# Patient Record
Sex: MALE | Race: BLACK OR AFRICAN AMERICAN | URBAN - METROPOLITAN AREA
[De-identification: names, ages, dates, MRNs, and addresses within clinical notes are randomized per-mention and may not be internally consistent; named-entity substitution may affect disease eponyms.]

---

## 2021-04-05 ENCOUNTER — OFFICE VISIT (OUTPATIENT)
Dept: URBAN - METROPOLITAN AREA CLINIC 17 | Facility: CLINIC | Age: 86
End: 2021-04-05
Payer: COMMERCIAL

## 2021-04-05 PROCEDURE — 92020 GONIOSCOPY: CPT | Performed by: OPHTHALMOLOGY

## 2021-04-05 PROCEDURE — 99204 OFFICE O/P NEW MOD 45 MIN: CPT | Performed by: OPHTHALMOLOGY

## 2021-04-05 PROCEDURE — 92133 CPTRZD OPH DX IMG PST SGM ON: CPT | Performed by: OPHTHALMOLOGY

## 2021-04-05 ASSESSMENT — INTRAOCULAR PRESSURE
OD: 33
OS: 33

## 2021-04-05 ASSESSMENT — KERATOMETRY
OD: 44.50
OS: 44.75

## 2021-04-05 NOTE — IMPRESSION/PLAN
Impression: Bilateral primary open-angle glaucoma, moderate stage: N65.7321. Plan: Pt has Glaucoma  Gonio :Bare SS, CHRISTINE temporal  Pachs: [phone]     Today's IOP : 33/33 BASELINE Target IOP low to mid teens Pt denies Fhx of Glaucoma Vision equal OU (Last vf Unable 2/2 CF Vision C/D:  0.8x0.8 OCT:62/53 4/5/21 Pt denies Sulfa Allergy   // Pt denies Lung /Heart dx Plan :
1. Continue Brimonidine BID OU 
latanoprost QHS OU

## 2021-04-05 NOTE — IMPRESSION/PLAN
Impression: Age-related nuclear cataract, bilateral: H25.13. Plan: Discussed cataract diagnosis with the patient. Discussed risks, benefits and alternatives to surgery including but not limited to: bleeding, infection, risk of vision loss, loss of the eye, need for other surgery. Patient voiced understanding and wishes to proceed. Patient elects surgical treatment. Specialty lens options discussed and pt declines. Patient desires surgery OU (( AIM  PL OU,)) Patient understands the need for glasses after surgery for BCVA. MIGS Discussed with pt limitations of MIGS device and it does not replace  Glaucoma eye drops and would have to continue treatment and would still need glasses after surgery. Understands possible use of iris stretch. Risk Level: 
Right eye first (PC IOL (Standard W/ canaloplasty /Hydrus ) Left eye second(PC IOL (Standard W/ canaloplasty /Hydrus ) Dense Lens, Deep Set **BIG BLOCK NEEDED**
30 Minutes Drops( pred, ketor, oflox Send drops  @Pre op )

## 2021-05-13 ENCOUNTER — OFFICE VISIT (OUTPATIENT)
Dept: URBAN - METROPOLITAN AREA CLINIC 23 | Facility: CLINIC | Age: 86
End: 2021-05-13
Payer: COMMERCIAL

## 2021-05-13 DIAGNOSIS — H25.13 AGE-RELATED NUCLEAR CATARACT, BILATERAL: Primary | ICD-10-CM

## 2021-05-13 DIAGNOSIS — H40.1132 BILATERAL PRIMARY OPEN-ANGLE GLAUCOMA, MODERATE STAGE: ICD-10-CM

## 2021-05-13 PROCEDURE — 99214 OFFICE O/P EST MOD 30 MIN: CPT | Performed by: OPHTHALMOLOGY

## 2021-05-13 RX ORDER — PREDNISOLONE ACETATE 10 MG/ML
1 % SUSPENSION/ DROPS OPHTHALMIC
Qty: 10 | Refills: 1 | Status: ACTIVE
Start: 2021-05-13

## 2021-05-13 RX ORDER — KETOROLAC TROMETHAMINE 5 MG/ML
0.5 % SOLUTION OPHTHALMIC
Qty: 5 | Refills: 1 | Status: ACTIVE
Start: 2021-05-13

## 2021-05-13 RX ORDER — BRIMONIDINE TARTRATE 2 MG/ML
0.2 % SOLUTION/ DROPS OPHTHALMIC
Qty: 0 | Refills: 0 | Status: ACTIVE
Start: 2021-05-13

## 2021-05-13 RX ORDER — LATANOPROST 50 UG/ML
0.005 % SOLUTION OPHTHALMIC
Qty: 2.5 | Refills: 3 | Status: ACTIVE
Start: 2021-05-13

## 2021-05-13 RX ORDER — OFLOXACIN 3 MG/ML
0.3 % SOLUTION/ DROPS OPHTHALMIC
Qty: 5 | Refills: 1 | Status: ACTIVE
Start: 2021-05-13

## 2021-05-13 ASSESSMENT — INTRAOCULAR PRESSURE
OS: 27
OS: 23
OD: 23
OD: 27

## 2021-05-13 ASSESSMENT — PACHYMETRY
OS: 2.78
OD: 23.20
OD: 2.79
OS: 23.08

## 2021-05-13 NOTE — IMPRESSION/PLAN
Impression: Bilateral primary open-angle glaucoma, moderate stage: Y16.2849. Plan: Pt has Glaucoma  Gonio :Bare SS, CHRISTINE temporal  Pachs: [phone]     Today's IOP : 27/27 Target IOP low to mid teens Pt denies Fhx of Glaucoma Vision equal OU (Last [facility] remaining OU 5/13/21 C/D:  0.9x0.85 Loss of temporal/0.95x0.95
OCT:62/53 4/5/21 Pt denies Sulfa Allergy   // Pt denies Lung /Heart dx Plan :
1. Continue Brimonidine BID OU 
latanoprost QHS OU 2.  Recommend Cat w/ MIGS (See cat assessment)

## 2021-05-13 NOTE — IMPRESSION/PLAN
Impression: Age-related nuclear cataract, bilateral: H25.13. Plan: Discussed cataract diagnosis with the patient. Discussed risks, benefits and alternatives to surgery including but not limited to: bleeding, infection, risk of vision loss, loss of the eye, need for other surgery. Patient voiced understanding and wishes to proceed. Patient elects surgical treatment. Specialty lens options discussed and pt declines. Patient desires surgery OU (( AIM  PL OU,)) Patient understands the need for glasses after surgery for BCVA. MIGS Discussed with pt limitations of MIGS device and it does not replace  Glaucoma eye drops and would have to continue treatment and would still need glasses after surgery. Understands possible use of iris stretch. Risk Level: 
Right eye first (PC IOL (Standard W/ canaloplasty /Hydrus ) Left eye second(PC IOL (Standard W/ canaloplasty /Hydrus ) Dense Lens, Deep Set **BIG BLOCK NEEDED**
30 Minutes Drops( pred, ketor, oflox

## 2021-05-18 ENCOUNTER — SURGERY (OUTPATIENT)
Dept: URBAN - METROPOLITAN AREA SURGERY 11 | Facility: SURGERY | Age: 86
End: 2021-05-18
Payer: COMMERCIAL

## 2021-05-18 PROCEDURE — 66174 TRLUML DIL AQ O/F CAN W/O ST: CPT | Performed by: OPHTHALMOLOGY

## 2021-05-18 PROCEDURE — 0191T INSERTION OF ANTERIOR SEGMENT AQUEOUS DRAINAGE DEVICE, W/OUT EXTRAOCULAR RESERVO: CPT | Performed by: OPHTHALMOLOGY

## 2021-06-01 ENCOUNTER — POST-OPERATIVE VISIT (OUTPATIENT)
Dept: URBAN - METROPOLITAN AREA CLINIC 29 | Facility: CLINIC | Age: 86
End: 2021-06-01
Payer: COMMERCIAL

## 2021-06-01 DIAGNOSIS — Z96.1 PRESENCE OF INTRAOCULAR LENS: ICD-10-CM

## 2021-06-01 PROCEDURE — 99024 POSTOP FOLLOW-UP VISIT: CPT | Performed by: OPHTHALMOLOGY

## 2021-06-01 ASSESSMENT — INTRAOCULAR PRESSURE
OD: 11
OD: 12

## 2021-06-01 NOTE — IMPRESSION/PLAN
Impression: Presence of intraocular lens: Z96.1. Plan: Patient is still healing. Cont post op drops.

## 2021-06-01 NOTE — IMPRESSION/PLAN
Impression: Bilateral primary open-angle glaucoma, moderate stage: H20.7781. Plan: Pt has Glaucoma Gonio :Bare SS, CHRISTINE temporal  Pachs: [phone]     Today's IOP : 12 OD Pt denies Fhx of Glaucoma Vision equal OU (Last [facility] remaining OU 5/13/21 C/D:  0.9x0.85 Loss of temporal/0.95x0.95
OCT:62/53 4/5/21 Pt denies Sulfa Allergy   // Pt denies Lung /Heart dx Plan :
1. Cont:
Brimonidine BID OU 
latanoprost QHS OU Pred QID OD Ketorolac QID OD 2. Patient is doing well post CE IOL + OMNI Hydrus OD. IOP has improved. 3. Return for second eye.